# Patient Record
Sex: MALE | Race: WHITE | NOT HISPANIC OR LATINO | URBAN - METROPOLITAN AREA
[De-identification: names, ages, dates, MRNs, and addresses within clinical notes are randomized per-mention and may not be internally consistent; named-entity substitution may affect disease eponyms.]

---

## 2024-08-23 ENCOUNTER — TELEPHONE ENCOUNTER (OUTPATIENT)
Dept: URBAN - METROPOLITAN AREA CLINIC 118 | Facility: CLINIC | Age: 69
End: 2024-08-23

## 2024-10-02 ENCOUNTER — OFFICE VISIT (OUTPATIENT)
Dept: URBAN - METROPOLITAN AREA CLINIC 109 | Facility: CLINIC | Age: 69
End: 2024-10-02

## 2024-10-02 RX ORDER — VELPATASVIR AND SOFOSBUVIR 100; 400 MG/1; MG/1
1 TABLET TABLET, FILM COATED ORAL ONCE A DAY
Qty: 84 TABLET | Refills: 0 | Status: ACTIVE | COMMUNITY
Start: 2024-08-07 | End: 2024-10-30

## 2024-10-02 RX ORDER — ALBUTEROL SULFATE 90 UG/1
1 PUFF AS NEEDED AEROSOL, METERED RESPIRATORY (INHALATION)
Status: ACTIVE | COMMUNITY

## 2024-10-02 RX ORDER — APIXABAN 5 MG/1
AS DIRECTED TABLET, FILM COATED ORAL
Status: ACTIVE | COMMUNITY

## 2025-05-13 ENCOUNTER — DASHBOARD ENCOUNTERS (OUTPATIENT)
Age: 70
End: 2025-05-13

## 2025-05-13 ENCOUNTER — LAB OUTSIDE AN ENCOUNTER (OUTPATIENT)
Dept: URBAN - METROPOLITAN AREA CLINIC 118 | Facility: CLINIC | Age: 70
End: 2025-05-13

## 2025-05-13 ENCOUNTER — OFFICE VISIT (OUTPATIENT)
Dept: URBAN - METROPOLITAN AREA CLINIC 118 | Facility: CLINIC | Age: 70
End: 2025-05-13
Payer: COMMERCIAL

## 2025-05-13 DIAGNOSIS — R13.10 DYSPHAGIA, UNSPECIFIED TYPE: ICD-10-CM

## 2025-05-13 PROBLEM — 40739000: Status: ACTIVE | Noted: 2025-05-13

## 2025-05-13 PROCEDURE — 99204 OFFICE O/P NEW MOD 45 MIN: CPT | Performed by: STUDENT IN AN ORGANIZED HEALTH CARE EDUCATION/TRAINING PROGRAM

## 2025-05-13 RX ORDER — OMEPRAZOLE 20 MG/1
CAPSULE, DELAYED RELEASE ORAL
Qty: 14 CAPSULE | Status: ACTIVE | COMMUNITY

## 2025-05-13 RX ORDER — OMEPRAZOLE 40 MG/1
1 CAPSULE 30 MINUTES BEFORE MEAL CAPSULE, DELAYED RELEASE ORAL TWICE DAILY
Qty: 180 | Refills: 2 | OUTPATIENT
Start: 2025-05-13

## 2025-05-13 RX ORDER — ALPRAZOLAM 0.5 MG/1
TABLET ORAL
Qty: 42 TABLET | Status: ACTIVE | COMMUNITY

## 2025-05-13 RX ORDER — LORATADINE 10 MG/1
TABLET ORAL
Qty: 14 TABLET | Status: ACTIVE | COMMUNITY

## 2025-05-13 RX ORDER — LISINOPRIL AND HYDROCHLOROTHIAZIDE 12.5; 2 MG/1; MG/1
TABLET ORAL
Qty: 15 TABLET | Status: ACTIVE | COMMUNITY

## 2025-05-13 NOTE — HPI-TODAY'S VISIT:
The patient is a 69-year-old male currently on hospice due to chronic heart failure (reports 10% of his heart functions) who presents with worsening dysphagia.  Patient complains of feeling that something is stuck in his throat.  This is worse at night, but different from reflux.  Patient notes that he has been on hospice for a while now (he was told that he only had 10 days but has been doing well despite this).  He currently takes omeprazole 40 and has been taking 80 for the past week without much improvement.  He states that this feeling is making him miserable.